# Patient Record
Sex: FEMALE
[De-identification: names, ages, dates, MRNs, and addresses within clinical notes are randomized per-mention and may not be internally consistent; named-entity substitution may affect disease eponyms.]

---

## 2018-07-30 ENCOUNTER — HOSPITAL ENCOUNTER (EMERGENCY)
Dept: HOSPITAL 88 - ER | Age: 43
Discharge: HOME | End: 2018-07-30
Payer: COMMERCIAL

## 2018-07-30 VITALS — WEIGHT: 229 LBS | BODY MASS INDEX: 40.57 KG/M2 | HEIGHT: 63 IN

## 2018-07-30 DIAGNOSIS — Z52.4: ICD-10-CM

## 2018-07-30 DIAGNOSIS — R10.9: Primary | ICD-10-CM

## 2018-07-30 DIAGNOSIS — N30.91: ICD-10-CM

## 2018-07-30 DIAGNOSIS — R11.0: ICD-10-CM

## 2018-07-30 LAB
ALBUMIN SERPL-MCNC: 3.7 G/DL (ref 3.5–5)
ALBUMIN/GLOB SERPL: 0.9 {RATIO} (ref 0.8–2)
ALP SERPL-CCNC: 103 IU/L (ref 40–150)
ALT SERPL-CCNC: 29 IU/L (ref 0–55)
AMYLASE SERPL-CCNC: 69 U/L (ref 25–125)
ANION GAP SERPL CALC-SCNC: 14.9 MMOL/L (ref 8–16)
BACTERIA URNS QL MICRO: (no result) /HPF
BASOPHILS # BLD AUTO: 0 10*3/UL (ref 0–0.1)
BASOPHILS NFR BLD AUTO: 0.2 % (ref 0–1)
BILIRUB UR QL: NEGATIVE
BUN SERPL-MCNC: 19 MG/DL (ref 7–26)
BUN/CREAT SERPL: 17 (ref 6–25)
CALCIUM SERPL-MCNC: 9.9 MG/DL (ref 8.4–10.2)
CHLORIDE SERPL-SCNC: 105 MMOL/L (ref 98–107)
CLARITY UR: (no result)
CO2 SERPL-SCNC: 22 MMOL/L (ref 22–29)
COLOR UR: YELLOW
DEPRECATED NEUTROPHILS # BLD AUTO: 7.8 10*3/UL (ref 2.1–6.9)
EGFRCR SERPLBLD CKD-EPI 2021: 52 ML/MIN (ref 60–?)
EOSINOPHIL # BLD AUTO: 0.3 10*3/UL (ref 0–0.4)
EOSINOPHIL NFR BLD AUTO: 2.3 % (ref 0–6)
EPI CELLS URNS QL MICRO: (no result) /LPF
ERYTHROCYTE [DISTWIDTH] IN CORD BLOOD: 13 % (ref 11.7–14.4)
GLOBULIN PLAS-MCNC: 4 G/DL (ref 2.3–3.5)
GLUCOSE SERPLBLD-MCNC: 109 MG/DL (ref 74–118)
HCG UR QL: NEGATIVE
HCT VFR BLD AUTO: 40.8 % (ref 34.2–44.1)
HGB BLD-MCNC: 13.9 G/DL (ref 12–16)
KETONES UR QL STRIP.AUTO: NEGATIVE
LEUKOCYTE ESTERASE UR QL STRIP.AUTO: (no result)
LIPASE SERPL-CCNC: 46 U/L (ref 8–78)
LYMPHOCYTES # BLD: 2.5 10*3/UL (ref 1–3.2)
LYMPHOCYTES NFR BLD AUTO: 21.8 % (ref 18–39.1)
MCH RBC QN AUTO: 27.6 PG (ref 28–32)
MCHC RBC AUTO-ENTMCNC: 34.1 G/DL (ref 31–35)
MCV RBC AUTO: 81.1 FL (ref 81–99)
MONOCYTES # BLD AUTO: 0.8 10*3/UL (ref 0.2–0.8)
MONOCYTES NFR BLD AUTO: 7.1 % (ref 4.4–11.3)
NEUTS SEG NFR BLD AUTO: 68.1 % (ref 38.7–80)
NITRITE UR QL STRIP.AUTO: POSITIVE
PLATELET # BLD AUTO: 348 X10E3/UL (ref 140–360)
POTASSIUM SERPL-SCNC: 3.9 MMOL/L (ref 3.5–5.1)
PROT UR QL STRIP.AUTO: (no result)
RBC # BLD AUTO: 5.03 X10E6/UL (ref 3.6–5.1)
SODIUM SERPL-SCNC: 138 MMOL/L (ref 136–145)
SP GR UR STRIP: 1.02 (ref 1.01–1.02)
UROBILINOGEN UR STRIP-MCNC: 0.2 MG/DL (ref 0.2–1)
WBC #/AREA URNS HPF: (no result) /HPF (ref 0–5)

## 2018-07-30 PROCEDURE — 36415 COLL VENOUS BLD VENIPUNCTURE: CPT

## 2018-07-30 PROCEDURE — 82150 ASSAY OF AMYLASE: CPT

## 2018-07-30 PROCEDURE — 99284 EMERGENCY DEPT VISIT MOD MDM: CPT

## 2018-07-30 PROCEDURE — 74176 CT ABD & PELVIS W/O CONTRAST: CPT

## 2018-07-30 PROCEDURE — 80053 COMPREHEN METABOLIC PANEL: CPT

## 2018-07-30 PROCEDURE — 81025 URINE PREGNANCY TEST: CPT

## 2018-07-30 PROCEDURE — 85025 COMPLETE CBC W/AUTO DIFF WBC: CPT

## 2018-07-30 PROCEDURE — 81001 URINALYSIS AUTO W/SCOPE: CPT

## 2018-07-30 PROCEDURE — 83690 ASSAY OF LIPASE: CPT

## 2018-07-30 NOTE — DIAGNOSTIC IMAGING REPORT
EXAM: CT ABDOMEN AND PELVIS without IV CONTRAST

INDICATION:  Pain to mid right abdomen

COMPARISON:  None 

TECHNIQUE: The abdomen and pelvis were scanned using a multidetector helical

scanner. Coronal and sagittal reformations were obtained. Routine protocol

performed.

IV Contrast: None

Oral Contrast: Gastrografin

CTDIvol has been reviewed. It is below the limits set by the Radiation Protocol

Committee (RPC).



FINDINGS:

LOWER THORAX: No consolidations



LIVER: Hepatic statement ptosis.

BILIARY: Cholelithiasis without inflammation. No ductal dilation.



SPLEEN: No masses

PANCREAS: No masses



ADRENALS: No nodules



RIGHT KIDNEY: No nephroureterolithiasis or hydronephrosis. 



LEFT KIDNEY: Absent



GI TRACT: No wall thickening or obstruction. Normal appendix.



VESSELS:      Unremarkable

PERITONEUM/RETROPERITONEUM: No free air or fluid

LYMPH NODES: No lymphadenopathy



REPRODUCTIVE ORGANS: Normal

BLADDER: Normal



SOFT TISSUES: Surgical changes of ventral hernia repair.

BONES: No suspicious bone lesions.



IMPRESSION:

No acute findings.



Cholelithiasis without evidence of acute cholecystitis.



No appendicitis.



Signed by: Dr. Fannie Levin M.D. on 7/30/2018 4:46 AM